# Patient Record
Sex: FEMALE
[De-identification: names, ages, dates, MRNs, and addresses within clinical notes are randomized per-mention and may not be internally consistent; named-entity substitution may affect disease eponyms.]

---

## 2021-08-05 ENCOUNTER — TRANSCRIPTION ENCOUNTER (OUTPATIENT)
Age: 31
End: 2021-08-05

## 2021-11-29 PROBLEM — Z00.00 ENCOUNTER FOR PREVENTIVE HEALTH EXAMINATION: Status: ACTIVE | Noted: 2021-11-29

## 2021-11-30 ENCOUNTER — APPOINTMENT (OUTPATIENT)
Dept: ORTHOPEDIC SURGERY | Facility: CLINIC | Age: 31
End: 2021-11-30
Payer: OTHER GOVERNMENT

## 2021-11-30 ENCOUNTER — NON-APPOINTMENT (OUTPATIENT)
Age: 31
End: 2021-11-30

## 2021-11-30 VITALS
BODY MASS INDEX: 31.49 KG/M2 | HEIGHT: 65 IN | DIASTOLIC BLOOD PRESSURE: 78 MMHG | WEIGHT: 189 LBS | HEART RATE: 73 BPM | SYSTOLIC BLOOD PRESSURE: 122 MMHG

## 2021-11-30 DIAGNOSIS — Z78.9 OTHER SPECIFIED HEALTH STATUS: ICD-10-CM

## 2021-11-30 DIAGNOSIS — Z86.2 PERSONAL HISTORY OF DISEASES OF THE BLOOD AND BLOOD-FORMING ORGANS AND CERTAIN DISORDERS INVOLVING THE IMMUNE MECHANISM: ICD-10-CM

## 2021-11-30 PROCEDURE — 99204 OFFICE O/P NEW MOD 45 MIN: CPT

## 2021-11-30 PROCEDURE — 73610 X-RAY EXAM OF ANKLE: CPT | Mod: LT

## 2021-11-30 RX ORDER — UBIDECARENONE/VIT E ACET 100MG-5
CAPSULE ORAL
Refills: 0 | Status: ACTIVE | COMMUNITY

## 2021-11-30 RX ORDER — GLUC/MSM/COLGN2/HYAL/ANTIARTH3 375-375-20
TABLET ORAL
Refills: 0 | Status: ACTIVE | COMMUNITY

## 2021-12-13 ENCOUNTER — TRANSCRIPTION ENCOUNTER (OUTPATIENT)
Age: 31
End: 2021-12-13

## 2021-12-28 ENCOUNTER — APPOINTMENT (OUTPATIENT)
Dept: ORTHOPEDIC SURGERY | Facility: CLINIC | Age: 31
End: 2021-12-28
Payer: OTHER GOVERNMENT

## 2021-12-28 PROCEDURE — 99213 OFFICE O/P EST LOW 20 MIN: CPT

## 2022-01-18 ENCOUNTER — APPOINTMENT (OUTPATIENT)
Dept: ORTHOPEDIC SURGERY | Facility: CLINIC | Age: 32
End: 2022-01-18
Payer: OTHER GOVERNMENT

## 2022-01-18 PROCEDURE — 99212 OFFICE O/P EST SF 10 MIN: CPT

## 2022-02-18 ENCOUNTER — APPOINTMENT (OUTPATIENT)
Dept: ORTHOPEDIC SURGERY | Facility: CLINIC | Age: 32
End: 2022-02-18
Payer: OTHER GOVERNMENT

## 2022-02-18 DIAGNOSIS — S93.492A SPRAIN OF OTHER LIGAMENT OF LEFT ANKLE, INITIAL ENCOUNTER: ICD-10-CM

## 2022-02-18 PROCEDURE — 99214 OFFICE O/P EST MOD 30 MIN: CPT

## 2022-03-07 ENCOUNTER — APPOINTMENT (OUTPATIENT)
Dept: ORTHOPEDIC SURGERY | Facility: CLINIC | Age: 32
End: 2022-03-07
Payer: OTHER GOVERNMENT

## 2022-03-07 VITALS
DIASTOLIC BLOOD PRESSURE: 83 MMHG | BODY MASS INDEX: 27.32 KG/M2 | HEIGHT: 65 IN | HEART RATE: 81 BPM | SYSTOLIC BLOOD PRESSURE: 132 MMHG | WEIGHT: 164 LBS

## 2022-03-07 DIAGNOSIS — S93.492D SPRAIN OF OTHER LIGAMENT OF LEFT ANKLE, SUBSEQUENT ENCOUNTER: ICD-10-CM

## 2022-03-07 DIAGNOSIS — M25.572 PAIN IN LEFT ANKLE AND JOINTS OF LEFT FOOT: ICD-10-CM

## 2022-03-07 PROCEDURE — 99213 OFFICE O/P EST LOW 20 MIN: CPT

## 2022-03-18 NOTE — PHYSICAL EXAM
[de-identified] : Physical Examination\par General: well nourished, in no acute distress, alert and oriented to person, place and time\par Psychiatric: normal mood and affect, no abnormal movements or speech patterns\par Eyes: vision intact - glasses\par \par \par Musculoskeletal Examination\par Ambulation	+ antalgic gait, - assistive devices\par \par Ankle			Right			Left\par General\par 	Deformity       	normal			moderate general pitting edema	\par 	Skin/Edema   	normal			normal\par 	Erythema       	-			-\par 	Alignment      	neutral			neutral\par Range of Motion\par 	Ankle Dorsiflex	20			10\par 	Ankle Plantarflex	45			25\par 	Inversion        	15			5\par 	Eversion		5			0\par Drawer\par 	ATFL		-			-\par 	CFL	                	-			-\par 	PTFL		-			-\par Palpation\par 	ATFL		-			+\par 	Medial Heel   	-			-\par 	Other		-			-\par Strength Examination/Atrophy\par 	EHL 		5+			5+\par 	FHL 		5+			5+\par 	Tibialis Anterior	5+			5+&\par 	Achilles/Soleus	5+			5+&\par Sensation\par 	Deep Peroneal	normal			normal\par 	Super Peroneal 	normal			normal\par 	Sural 		normal			normal\par 	Posterior Tibial 	normal			normal\par 	Saphenous    	normal			normal\par Pulses\par 	DP   		2+			2+\par \par \par  [de-identified] : 3 views of the affected L ankle, (AP, Oblique and Lateral)\par 11/30/21\par [Is] intact Mortise\par [Normal] Talus contour [without] cysts\par - osteophtes\par - Os Trigonum\par - spurring\par [No] soft tissue calcification\par

## 2022-03-18 NOTE — HISTORY OF PRESENT ILLNESS
[de-identified] : CC left ankle\par \par HPI 30 yo female presents with 1 mo post partum fu to acute onset of 2 month activity related pain in the anterolateral left ankle after twisting injury. The pain is same, and rated a #7 out of 10, described as sharp throbbing burning, [without radiation]. Rest makes the pain better and walking makes the pain worse. The patient reports associated symptoms of swelling instability. The patient [Denies] similar pain previously []. \par \par The patient has tried the following treatments:\par Activity modification	+\par Ice/Compression  	[+] stocking too tight as patient was pregnant\par Braces    		+\par Nsaids    		[-] unable due to pregnancy and post partum\par Physical Therapy  	+ initially but pt deferred due to swelling in the legs in jan and now feb due to post partum status\par Cortisone Injection	[-]\par Arthroscopy		[-]\par \par mild anterolateral pain, some posteromedial pain\par \par \par \par \par Review of Systems is positive for the above musculoskeletal symptoms and is otherwise non-contributory for general, constitutional, psychiatric, neurologic, HEENT, cardiac, respiratory, gastrointestinal, reproductive, lymphatic, and dermatologic complaints.\par \par Consult by  [  ]\par

## 2022-03-18 NOTE — DISCUSSION/SUMMARY
[de-identified] : \par Left ankle sprain\par \par \par PT\par \par Cannot prescribe nonsteroidal anti-inflammatories due to post partum status\par \par Elevate ice compressive stocking\par \par light duty 6 weeks - patient reports no light duty available, discussed that will be equivalent to out of work\par \par Followup 6 weeks

## 2022-03-18 NOTE — DISCUSSION/SUMMARY
[de-identified] : \par Left ankle sprain\par \par Discussed my findings and history exam and radiology\par \par Physical therapy\par \par Cannot prescribe nonsteroidal anti-inflammatories due to pregnancy status\par \par Can boot for 2 weeks\par \par Work note for 4 weeks\par \par Elevate ice compressive dressing\par \par Followup 4 weeks

## 2022-03-18 NOTE — HISTORY OF PRESENT ILLNESS
[de-identified] : CC left ankle\par \par HPI 32 yo female pregnant presents with PT 1 month fu to acute onset of 1 month activity related pain in the anterolateral left ankle after twisting injury. The pain is same, and rated a #7 out of 10, described as sharp throbbing burning, [without radiation]. Rest makes the pain better and walking makes the pain worse. The patient reports associated symptoms of swelling instability. The patient [Denies] similar pain previously []. \par \par The patient has tried the following treatments:\par Activity modification	+\par Ice/Compression  	+\par Braces    		+\par Nsaids    		[-] unable due to pregnancy\par Physical Therapy  	+\par Cortisone Injection	[-]\par Arthroscopy		[-]\par \par pain improved from last visit\par 5/10, activity related pain to left anterolateral ankle\par 5 sessions physical therapy somewhat helpful\par unable to take nsaids due to pregnancy status\par \par \par \par Review of Systems is positive for the above musculoskeletal symptoms and is otherwise non-contributory for general, constitutional, psychiatric, neurologic, HEENT, cardiac, respiratory, gastrointestinal, reproductive, lymphatic, and dermatologic complaints.\par \par Consult by  [  ]\par

## 2022-03-18 NOTE — DISCUSSION/SUMMARY
[de-identified] : \par Left ankle sprain\par \par \par continue with Physical therapy\par \par Cannot prescribe nonsteroidal anti-inflammatories due to pregnancy status\par \par Elevate ice compressive stocking\par \par Followup 4 weeks

## 2022-03-18 NOTE — DISCUSSION/SUMMARY
[de-identified] : \par Left ankle sprain\par \par \par continue with HEP\par \par Cannot prescribe nonsteroidal anti-inflammatories due to pregnancy status\par \par Elevate ice compressive stocking\par \par Followup 4 weeks

## 2022-03-18 NOTE — PHYSICAL EXAM
[de-identified] : Physical Examination\par General: well nourished, in no acute distress, alert and oriented to person, place and time\par Psychiatric: normal mood and affect, no abnormal movements or speech patterns\par Eyes: vision intact - glasses\par \par \par Musculoskeletal Examination\par Ambulation	+ antalgic gait, - assistive devices\par \par Ankle			Right			Left\par General\par 	Deformity       	normal			moderate lateral swelling	\par 	Skin/Edema   	normal			normal\par 	Erythema       	-			-\par 	Alignment      	neutral			neutral\par Range of Motion\par 	Ankle Dorsiflex	20			0\par 	Ankle Plantarflex	45			15\par 	Inversion        	15			0\par 	Eversion		5			0\par Drawer\par 	ATFL		-			-\par 	CFL	                	-			-\par 	PTFL		-			-\par Palpation\par 	ATFL		-			+\par 	Medial Heel   	-			-\par 	Other		-			-\par Strength Examination/Atrophy\par 	EHL 		5+			5+\par 	FHL 		5+			5+\par 	Tibialis Anterior	5+			5+&\par 	Achilles/Soleus	5+			5+&\par Sensation\par 	Deep Peroneal	normal			normal\par 	Super Peroneal 	normal			normal\par 	Sural 		normal			normal\par 	Posterior Tibial 	normal			normal\par 	Saphenous    	normal			normal\par Pulses\par 	DP   		2+			2+\par \par \par  [de-identified] : 3 views of the affected L ankle, (AP, Oblique and Lateral)\par were ordered, obtained and evaluated by myself today and\par demonstrate:\par [Is] intact Mortise\par [Normal] Talus contour [without] cysts\par - osteophtes\par - Os Trigonum\par - spurring\par [No] soft tissue calcification

## 2022-03-18 NOTE — HISTORY OF PRESENT ILLNESS
[de-identified] : CC left ankle\par \par HPI 32 yo female presents with acute onset of 3 days of constant pain in the anterolateral left ankle after twisting injury. The pain is same, and rated a #7 out of 10, described as sharp throbbing burning, [without radiation]. Rest makes the pain better and walking makes the pain worse. The patient reports associated symptoms of swelling instability. The patient [Denies] similar pain previously []. \par \par The patient has tried the following treatments:\par Activity modification	[-]\par Ice/Compression  	[-]\par Braces    		[-]\par Nsaids    		[-]\par Physical Therapy  	[-]\par Cortisone Injection	[-]\par Arthroscopy		[-]\par \par \par Review of Systems is positive for the above musculoskeletal symptoms and is otherwise non-contributory for general, constitutional, psychiatric, neurologic, HEENT, cardiac, respiratory, gastrointestinal, reproductive, lymphatic, and dermatologic complaints.\par \par Consult by  [  ]\par

## 2022-03-18 NOTE — HISTORY OF PRESENT ILLNESS
[de-identified] : CC left ankle\par \par HPI 32 yo female presents with 1 month PT, compression stocking fu to acute onset of 2 month activity related pain in the anterolateral left ankle after twisting injury. The pain is same, and rated a #7 out of 10, described as sharp throbbing burning, [without radiation]. Rest makes the pain better and walking makes the pain worse. The patient reports associated symptoms of swelling instability. The patient [Denies] similar pain previously []. \par \par The patient has tried the following treatments:\par Activity modification	+\par Ice/Compression  	[+] stocking too tight as patient is pregnant\par Braces    		+\par Nsaids    		[-] unable due to pregnancy\par Physical Therapy  	+, none since last visit due to swelling in legs\par Cortisone Injection	[-]\par Arthroscopy		[-]\par \par pain improved from last visit\par 4/10\par activity related pain to left anterolateral ankle\par worsened with prolonged standing, better w/ elevation\par \par unable to take nsaids due to pregnancy status\par \par \par \par Review of Systems is positive for the above musculoskeletal symptoms and is otherwise non-contributory for general, constitutional, psychiatric, neurologic, HEENT, cardiac, respiratory, gastrointestinal, reproductive, lymphatic, and dermatologic complaints.\par \par Consult by  [  ]\par

## 2022-03-18 NOTE — PHYSICAL EXAM
[de-identified] : Physical Examination\par General: well nourished, in no acute distress, alert and oriented to person, place and time\par Psychiatric: normal mood and affect, no abnormal movements or speech patterns\par Eyes: vision intact - glasses\par \par \par Musculoskeletal Examination\par Ambulation	+ antalgic gait, - assistive devices\par \par Ankle			Right			Left\par General\par 	Deformity       	moderate general pitting edema			moderate general pitting edema	\par 	Skin/Edema   	normal			normal\par 	Erythema       	-			-\par 	Alignment      	neutral			neutral\par Range of Motion\par 	Ankle Dorsiflex	20			10\par 	Ankle Plantarflex	45			25\par 	Inversion        	15			5\par 	Eversion		5			0\par Drawer\par 	ATFL		-			-\par 	CFL	                	-			-\par 	PTFL		-			-\par Palpation\par 	ATFL		-			+\par 	Medial Heel   	-			-\par 	Other		-			-\par Strength Examination/Atrophy\par 	EHL 		5+			5+\par 	FHL 		5+			5+\par 	Tibialis Anterior	5+			5+&\par 	Achilles/Soleus	5+			5+&\par Sensation\par 	Deep Peroneal	normal			normal\par 	Super Peroneal 	normal			normal\par 	Sural 		normal			normal\par 	Posterior Tibial 	normal			normal\par 	Saphenous    	normal			normal\par Pulses\par 	DP   		2+			2+\par \par \par  [de-identified] : 3 views of the affected L ankle, (AP, Oblique and Lateral)\par 11/30/21\par [Is] intact Mortise\par [Normal] Talus contour [without] cysts\par - osteophtes\par - Os Trigonum\par - spurring\par [No] soft tissue calcification\par

## 2022-03-18 NOTE — PHYSICAL EXAM
[de-identified] : Physical Examination\par General: well nourished, in no acute distress, alert and oriented to person, place and time\par Psychiatric: normal mood and affect, no abnormal movements or speech patterns\par Eyes: vision intact - glasses\par \par \par Musculoskeletal Examination\par Ambulation	- antalgic gait, - assistive devices\par \par Ankle			Right			Left\par General\par 	Deformity       	normal			normal	\par 	Skin/Edema   	normal			normal\par 	Erythema       	-			-\par 	Alignment      	neutral			neutral\par Range of Motion\par 	Ankle Dorsiflex	20			20\par 	Ankle Plantarflex	45			45\par 	Inversion        	15			10\par 	Eversion		5			5\par Drawer\par 	ATFL		-			-\par 	CFL	                	-			-\par 	PTFL		-			-\par Palpation\par 	ATFL		-			+ trace\par 	Medial Heel   	-			-\par 	Other		-			-\par Strength Examination/Atrophy\par 	EHL 		5+			5+\par 	FHL 		5+			5+\par 	Tibialis Anterior	5+			5+\par 	Achilles/Soleus	5+			5+\par near identical single toe rise and squat\par normal toe walk\par Sensation\par 	Deep Peroneal	normal			normal\par 	Super Peroneal 	normal			normal\par 	Sural 		normal			normal\par 	Posterior Tibial 	normal			normal\par 	Saphenous    	normal			normal\par Pulses\par 	DP   		2+			2+\par \par \par  [de-identified] : 3 views of the affected L ankle, (AP, Oblique and Lateral)\par 11/30/21\par [Is] intact Mortise\par [Normal] Talus contour [without] cysts\par - osteophtes\par - Os Trigonum\par - spurring\par [No] soft tissue calcification\par

## 2022-04-01 ENCOUNTER — APPOINTMENT (OUTPATIENT)
Dept: ORTHOPEDIC SURGERY | Facility: CLINIC | Age: 32
End: 2022-04-01

## 2022-04-07 ENCOUNTER — NON-APPOINTMENT (OUTPATIENT)
Age: 32
End: 2022-04-07

## 2022-04-11 ENCOUNTER — APPOINTMENT (OUTPATIENT)
Dept: ORTHOPEDIC SURGERY | Facility: CLINIC | Age: 32
End: 2022-04-11
Payer: OTHER GOVERNMENT

## 2022-04-11 DIAGNOSIS — S93.492D SPRAIN OF OTHER LIGAMENT OF LEFT ANKLE, SUBSEQUENT ENCOUNTER: ICD-10-CM

## 2022-04-11 PROCEDURE — 99212 OFFICE O/P EST SF 10 MIN: CPT

## 2023-12-13 ENCOUNTER — NON-APPOINTMENT (OUTPATIENT)
Age: 33
End: 2023-12-13

## 2024-01-04 ENCOUNTER — NON-APPOINTMENT (OUTPATIENT)
Age: 34
End: 2024-01-04

## 2024-03-21 ENCOUNTER — NON-APPOINTMENT (OUTPATIENT)
Age: 34
End: 2024-03-21